# Patient Record
Sex: FEMALE | Race: WHITE | NOT HISPANIC OR LATINO
[De-identification: names, ages, dates, MRNs, and addresses within clinical notes are randomized per-mention and may not be internally consistent; named-entity substitution may affect disease eponyms.]

---

## 2017-03-17 PROBLEM — Z00.00 ENCOUNTER FOR PREVENTIVE HEALTH EXAMINATION: Status: ACTIVE | Noted: 2017-03-17

## 2017-04-05 ENCOUNTER — APPOINTMENT (OUTPATIENT)
Dept: UROLOGY | Facility: CLINIC | Age: 77
End: 2017-04-05

## 2017-04-05 ENCOUNTER — OUTPATIENT (OUTPATIENT)
Dept: OUTPATIENT SERVICES | Facility: HOSPITAL | Age: 77
LOS: 1 days | Discharge: HOME | End: 2017-04-05

## 2017-04-12 ENCOUNTER — APPOINTMENT (OUTPATIENT)
Dept: UROLOGY | Facility: CLINIC | Age: 77
End: 2017-04-12

## 2017-06-27 DIAGNOSIS — N20.0 CALCULUS OF KIDNEY: ICD-10-CM

## 2017-06-27 DIAGNOSIS — E78.00 PURE HYPERCHOLESTEROLEMIA, UNSPECIFIED: ICD-10-CM

## 2017-06-27 DIAGNOSIS — Z91.041 RADIOGRAPHIC DYE ALLERGY STATUS: ICD-10-CM

## 2017-06-27 DIAGNOSIS — I10 ESSENTIAL (PRIMARY) HYPERTENSION: ICD-10-CM

## 2017-07-05 ENCOUNTER — APPOINTMENT (OUTPATIENT)
Dept: UROLOGY | Facility: CLINIC | Age: 77
End: 2017-07-05

## 2017-07-05 VITALS
HEIGHT: 64 IN | SYSTOLIC BLOOD PRESSURE: 137 MMHG | BODY MASS INDEX: 25.95 KG/M2 | WEIGHT: 152 LBS | HEART RATE: 93 BPM | DIASTOLIC BLOOD PRESSURE: 76 MMHG

## 2017-07-05 DIAGNOSIS — R31.29 OTHER MICROSCOPIC HEMATURIA: ICD-10-CM

## 2017-07-05 DIAGNOSIS — Z86.39 PERSONAL HISTORY OF OTHER ENDOCRINE, NUTRITIONAL AND METABOLIC DISEASE: ICD-10-CM

## 2017-07-05 DIAGNOSIS — Z80.52 FAMILY HISTORY OF MALIGNANT NEOPLASM OF BLADDER: ICD-10-CM

## 2017-07-05 DIAGNOSIS — Z82.49 FAMILY HISTORY OF ISCHEMIC HEART DISEASE AND OTHER DISEASES OF THE CIRCULATORY SYSTEM: ICD-10-CM

## 2017-07-05 DIAGNOSIS — Z63.4 DISAPPEARANCE AND DEATH OF FAMILY MEMBER: ICD-10-CM

## 2017-07-05 DIAGNOSIS — Z87.442 PERSONAL HISTORY OF URINARY CALCULI: ICD-10-CM

## 2017-07-05 DIAGNOSIS — Z86.79 PERSONAL HISTORY OF OTHER DISEASES OF THE CIRCULATORY SYSTEM: ICD-10-CM

## 2017-07-05 LAB
BILIRUB UR QL STRIP: NORMAL
CLARITY UR: CLEAR
COLLECTION METHOD: NORMAL
GLUCOSE UR-MCNC: NORMAL
HCG UR QL: 2 EU/DL
HGB UR QL STRIP.AUTO: 10
KETONES UR-MCNC: NORMAL
LEUKOCYTE ESTERASE UR QL STRIP: 25
NITRITE UR QL STRIP: NORMAL
PH UR STRIP: 5
PROT UR STRIP-MCNC: 30
SP GR UR STRIP: 1.02

## 2017-07-05 SDOH — SOCIAL STABILITY - SOCIAL INSECURITY: DISSAPEARANCE AND DEATH OF FAMILY MEMBER: Z63.4

## 2017-08-25 ENCOUNTER — OUTPATIENT (OUTPATIENT)
Dept: OUTPATIENT SERVICES | Facility: HOSPITAL | Age: 77
LOS: 1 days | Discharge: HOME | End: 2017-08-25

## 2017-08-25 DIAGNOSIS — N85.00 ENDOMETRIAL HYPERPLASIA, UNSPECIFIED: ICD-10-CM

## 2017-08-25 DIAGNOSIS — Z01.818 ENCOUNTER FOR OTHER PREPROCEDURAL EXAMINATION: ICD-10-CM

## 2017-08-25 DIAGNOSIS — N95.0 POSTMENOPAUSAL BLEEDING: ICD-10-CM

## 2017-09-08 ENCOUNTER — OUTPATIENT (OUTPATIENT)
Dept: OUTPATIENT SERVICES | Facility: HOSPITAL | Age: 77
LOS: 1 days | Discharge: HOME | End: 2017-09-08

## 2017-09-13 DIAGNOSIS — C54.1 MALIGNANT NEOPLASM OF ENDOMETRIUM: ICD-10-CM

## 2017-09-13 DIAGNOSIS — E78.00 PURE HYPERCHOLESTEROLEMIA, UNSPECIFIED: ICD-10-CM

## 2017-09-13 DIAGNOSIS — Z91.041 RADIOGRAPHIC DYE ALLERGY STATUS: ICD-10-CM

## 2017-09-13 DIAGNOSIS — N95.0 POSTMENOPAUSAL BLEEDING: ICD-10-CM

## 2017-09-13 DIAGNOSIS — I10 ESSENTIAL (PRIMARY) HYPERTENSION: ICD-10-CM

## 2019-05-24 ENCOUNTER — APPOINTMENT (OUTPATIENT)
Dept: ORTHOPEDIC SURGERY | Facility: CLINIC | Age: 79
End: 2019-05-24
Payer: MEDICARE

## 2019-05-24 DIAGNOSIS — M25.551 PAIN IN RIGHT HIP: ICD-10-CM

## 2019-05-24 DIAGNOSIS — M25.552 PAIN IN LEFT HIP: ICD-10-CM

## 2019-05-24 DIAGNOSIS — M16.12 UNILATERAL PRIMARY OSTEOARTHRITIS, LEFT HIP: ICD-10-CM

## 2019-05-24 DIAGNOSIS — Z85.42 PERSONAL HISTORY OF MALIGNANT NEOPLASM OF OTHER PARTS OF UTERUS: ICD-10-CM

## 2019-05-24 DIAGNOSIS — Z87.39 PERSONAL HISTORY OF OTHER DISEASES OF THE MUSCULOSKELETAL SYSTEM AND CONNECTIVE TISSUE: ICD-10-CM

## 2019-05-24 PROCEDURE — 99203 OFFICE O/P NEW LOW 30 MIN: CPT

## 2019-05-24 PROCEDURE — 73522 X-RAY EXAM HIPS BI 3-4 VIEWS: CPT

## 2019-05-24 RX ORDER — ALENDRONATE SODIUM 35 MG/1
35 TABLET ORAL
Refills: 0 | Status: ACTIVE | COMMUNITY

## 2019-05-24 RX ORDER — LOVASTATIN 40 MG/1
40 TABLET ORAL
Refills: 0 | Status: ACTIVE | COMMUNITY

## 2019-05-24 RX ORDER — LOSARTAN POTASSIUM 50 MG/1
50 TABLET, FILM COATED ORAL
Refills: 0 | Status: ACTIVE | COMMUNITY

## 2019-05-24 RX ORDER — LOVASTATIN 40 MG/1
TABLET ORAL
Refills: 0 | Status: DISCONTINUED | COMMUNITY
End: 2019-05-24

## 2019-05-24 RX ORDER — VALSARTAN 160 MG
CAPSULE ORAL
Refills: 0 | Status: DISCONTINUED | COMMUNITY
End: 2019-05-24

## 2019-05-24 NOTE — HISTORY OF PRESENT ILLNESS
[Pain Location] : pain [de-identified] : 78 year old female with a significant past medical history of uterine cancer presents to the office today complaining of bilateral hip pain. She reports the left hip is more painful than the right. Pt reports noticing the pain about 1.5 years ago. She states the pain has gotten worse since she first noticed it. She reports increased pain with standing from a seated position and long periods of ambulation. She reports taking Extra strength Tylenol with only minimal relief. She is ambulating with a cane due to her pain. Pt reports some of her activities of daily living are compromised. Pt is looking to discuss both surgical and non surgical options in regard to pain relief for her bilateral hips. [Worsening] : worsening [7] : a maximum pain level of 7/10 [Walking] : walking [Standing] : standing [Daily] : ~He/She~ states the symptoms seem to be occuring daily

## 2019-05-24 NOTE — PHYSICAL EXAM
[de-identified] : General appearance: well nourished and hydrated, pleasant, alert and oriented x 3, cooperative.\par Cardiovascular: no apparent abnormalities, no lower leg edema, no varicosities, pedal pulses are palpable.\par Neurologic: sensation is normal, no muscle weakness in upper or lower extremities.\par Gait: nonantalgic.\par \par Left hip\par Inspection: No swelling or ecchymosis.\par Wounds: none.\par Palpation: non-tender.\par Stability: no instability.\par Strength: 3/5 all motor groups.\par ROM: REstricted and painful active and passive ROM. Pain with ROM\par Leg length: equal.\par \par Right hip\par Inspection: No swelling or ecchymosis.\par Wounds: none.\par Palpation: non-tender.\par Stability: no instability.\par Strength: 5/5 all motor groups.\par ROM: Full active and passive ROM. Mild pain with abduction. \par Leg length: equal.\par \par \par

## 2021-01-02 ENCOUNTER — TRANSCRIPTION ENCOUNTER (OUTPATIENT)
Age: 81
End: 2021-01-02

## 2021-05-18 ENCOUNTER — RESULT REVIEW (OUTPATIENT)
Age: 81
End: 2021-05-18

## 2021-05-18 ENCOUNTER — OUTPATIENT (OUTPATIENT)
Dept: OUTPATIENT SERVICES | Facility: HOSPITAL | Age: 81
LOS: 1 days | Discharge: HOME | End: 2021-05-18
Payer: MEDICARE

## 2021-05-18 VITALS
HEIGHT: 64 IN | RESPIRATION RATE: 20 BRPM | OXYGEN SATURATION: 100 % | WEIGHT: 164.02 LBS | DIASTOLIC BLOOD PRESSURE: 70 MMHG | TEMPERATURE: 97 F | HEART RATE: 63 BPM | SYSTOLIC BLOOD PRESSURE: 157 MMHG

## 2021-05-18 DIAGNOSIS — Z01.818 ENCOUNTER FOR OTHER PREPROCEDURAL EXAMINATION: ICD-10-CM

## 2021-05-18 DIAGNOSIS — M16.12 UNILATERAL PRIMARY OSTEOARTHRITIS, LEFT HIP: ICD-10-CM

## 2021-05-18 DIAGNOSIS — Z98.890 OTHER SPECIFIED POSTPROCEDURAL STATES: Chronic | ICD-10-CM

## 2021-05-18 LAB
A1C WITH ESTIMATED AVERAGE GLUCOSE RESULT: 5.5 % — SIGNIFICANT CHANGE UP (ref 4–5.6)
ALBUMIN SERPL ELPH-MCNC: 4.7 G/DL — SIGNIFICANT CHANGE UP (ref 3.5–5.2)
ALP SERPL-CCNC: 119 U/L — HIGH (ref 30–115)
ALT FLD-CCNC: 17 U/L — SIGNIFICANT CHANGE UP (ref 0–41)
ANION GAP SERPL CALC-SCNC: 13 MMOL/L — SIGNIFICANT CHANGE UP (ref 7–14)
APTT BLD: 30.9 SEC — SIGNIFICANT CHANGE UP (ref 27–39.2)
AST SERPL-CCNC: 20 U/L — SIGNIFICANT CHANGE UP (ref 0–41)
BASOPHILS # BLD AUTO: 0.02 K/UL — SIGNIFICANT CHANGE UP (ref 0–0.2)
BASOPHILS NFR BLD AUTO: 0.4 % — SIGNIFICANT CHANGE UP (ref 0–1)
BILIRUB SERPL-MCNC: 0.4 MG/DL — SIGNIFICANT CHANGE UP (ref 0.2–1.2)
BUN SERPL-MCNC: 17 MG/DL — SIGNIFICANT CHANGE UP (ref 10–20)
CALCIUM SERPL-MCNC: 9.8 MG/DL — SIGNIFICANT CHANGE UP (ref 8.5–10.1)
CHLORIDE SERPL-SCNC: 100 MMOL/L — SIGNIFICANT CHANGE UP (ref 98–110)
CO2 SERPL-SCNC: 26 MMOL/L — SIGNIFICANT CHANGE UP (ref 17–32)
CREAT SERPL-MCNC: 0.6 MG/DL — LOW (ref 0.7–1.5)
EOSINOPHIL # BLD AUTO: 0.07 K/UL — SIGNIFICANT CHANGE UP (ref 0–0.7)
EOSINOPHIL NFR BLD AUTO: 1.3 % — SIGNIFICANT CHANGE UP (ref 0–8)
ESTIMATED AVERAGE GLUCOSE: 111 MG/DL — SIGNIFICANT CHANGE UP (ref 68–114)
GLUCOSE SERPL-MCNC: 84 MG/DL — SIGNIFICANT CHANGE UP (ref 70–99)
HCT VFR BLD CALC: 37.2 % — SIGNIFICANT CHANGE UP (ref 37–47)
HGB BLD-MCNC: 11.3 G/DL — LOW (ref 12–16)
IMM GRANULOCYTES NFR BLD AUTO: 0.4 % — HIGH (ref 0.1–0.3)
INR BLD: 0.99 RATIO — SIGNIFICANT CHANGE UP (ref 0.65–1.3)
LYMPHOCYTES # BLD AUTO: 0.82 K/UL — LOW (ref 1.2–3.4)
LYMPHOCYTES # BLD AUTO: 15 % — LOW (ref 20.5–51.1)
MCHC RBC-ENTMCNC: 21.4 PG — LOW (ref 27–31)
MCHC RBC-ENTMCNC: 30.4 G/DL — LOW (ref 32–37)
MCV RBC AUTO: 70.6 FL — LOW (ref 81–99)
MONOCYTES # BLD AUTO: 0.43 K/UL — SIGNIFICANT CHANGE UP (ref 0.1–0.6)
MONOCYTES NFR BLD AUTO: 7.9 % — SIGNIFICANT CHANGE UP (ref 1.7–9.3)
MRSA PCR RESULT.: NEGATIVE — SIGNIFICANT CHANGE UP
NEUTROPHILS # BLD AUTO: 4.1 K/UL — SIGNIFICANT CHANGE UP (ref 1.4–6.5)
NEUTROPHILS NFR BLD AUTO: 75 % — SIGNIFICANT CHANGE UP (ref 42.2–75.2)
NRBC # BLD: 0 /100 WBCS — SIGNIFICANT CHANGE UP (ref 0–0)
PLATELET # BLD AUTO: 234 K/UL — SIGNIFICANT CHANGE UP (ref 130–400)
POTASSIUM SERPL-MCNC: 4.2 MMOL/L — SIGNIFICANT CHANGE UP (ref 3.5–5)
POTASSIUM SERPL-SCNC: 4.2 MMOL/L — SIGNIFICANT CHANGE UP (ref 3.5–5)
PROT SERPL-MCNC: 7 G/DL — SIGNIFICANT CHANGE UP (ref 6–8)
PROTHROM AB SERPL-ACNC: 11.4 SEC — SIGNIFICANT CHANGE UP (ref 9.95–12.87)
RBC # BLD: 5.27 M/UL — SIGNIFICANT CHANGE UP (ref 4.2–5.4)
RBC # FLD: 14.6 % — HIGH (ref 11.5–14.5)
SODIUM SERPL-SCNC: 139 MMOL/L — SIGNIFICANT CHANGE UP (ref 135–146)
WBC # BLD: 5.46 K/UL — SIGNIFICANT CHANGE UP (ref 4.8–10.8)
WBC # FLD AUTO: 5.46 K/UL — SIGNIFICANT CHANGE UP (ref 4.8–10.8)

## 2021-05-18 PROCEDURE — 71046 X-RAY EXAM CHEST 2 VIEWS: CPT | Mod: 26

## 2021-05-18 PROCEDURE — 93010 ELECTROCARDIOGRAM REPORT: CPT

## 2021-05-18 PROCEDURE — 73502 X-RAY EXAM HIP UNI 2-3 VIEWS: CPT | Mod: 26,LT

## 2021-05-18 RX ORDER — LOVASTATIN 20 MG
1 TABLET ORAL
Qty: 0 | Refills: 0 | DISCHARGE

## 2021-05-18 RX ORDER — AMLODIPINE BESYLATE 2.5 MG/1
1 TABLET ORAL
Qty: 0 | Refills: 0 | DISCHARGE

## 2021-05-18 NOTE — H&P PST ADULT - NSICDXPASTMEDICALHX_GEN_ALL_CORE_FT
PAST MEDICAL HISTORY:  Arthritis     HTN (hypertension)     Hypercholesteremia     Renal calculi     Uterine cancer

## 2021-05-18 NOTE — H&P PST ADULT - HISTORY OF PRESENT ILLNESS
Patient is a 80 year old female presenting to [facility] in preparation for  left hip replacement on  6/2 under regional anesthesia by Dr. Redd  reports no c/o cp,sob,palpitations,cough or dysuris  1-2 fos without sob    Patient denies any signs or symptoms of COVID 19 and denies contact with known positive individuals.  They have an appointment for repeat COVID testing pre-procedure and acknowledge its time and place.  They were instructed to quarantine pre-procedure, practice exposure control measures, continue to self-monitor and report any concerns to their proceduralist.    Anesthesia Alert  NO--Difficult Airway  NO--History of neck surgery or radiation  NO--Limited ROM of neck  NO--History of Malignant hyperthermia  NO--Personal or family history of Pseudocholinesterase deficiency  NO--Prior Anesthesia Complication  NO--Latex Allergy  YES--Loose teeth/  LOWER DENTURES  NO--History of Rheumatoid Arthritis  NO--HALIMA  NO-- BLEEDING RISK  NO--Other_____    As per patient, this is their complete medical and surgical history, including medications both prescribed or over the counter.  Patient verbalized understanding of instructions and was given the opportunity to ask questions and have them answered.

## 2021-05-18 NOTE — H&P PST ADULT - ENMT
Pending Prescriptions:                       Disp   Refills    oxyCODONE-acetaminophen (PERCOCET) 5-325 M*24 tab*0        Sig: Take 1 tablet by mouth every 6 hours as needed for           pain    Routing refill request to provider for review/approval because:  Drug not on the [facility] refill protocol          No oral lesions; no gross abnormalities

## 2021-05-18 NOTE — H&P PST ADULT - NSICDXFAMILYHX_GEN_ALL_CORE_FT
FAMILY HISTORY:  Family history of CHF (congestive heart failure)    Mother  Still living? No  FH: cancer, Age at diagnosis: Age Unknown

## 2021-05-30 ENCOUNTER — OUTPATIENT (OUTPATIENT)
Dept: OUTPATIENT SERVICES | Facility: HOSPITAL | Age: 81
LOS: 1 days | Discharge: HOME | End: 2021-05-30

## 2021-05-30 DIAGNOSIS — Z11.59 ENCOUNTER FOR SCREENING FOR OTHER VIRAL DISEASES: ICD-10-CM

## 2021-05-30 DIAGNOSIS — Z98.890 OTHER SPECIFIED POSTPROCEDURAL STATES: Chronic | ICD-10-CM

## 2021-05-30 PROBLEM — I10 ESSENTIAL (PRIMARY) HYPERTENSION: Chronic | Status: ACTIVE | Noted: 2021-05-18

## 2021-05-30 PROBLEM — E78.00 PURE HYPERCHOLESTEROLEMIA, UNSPECIFIED: Chronic | Status: ACTIVE | Noted: 2021-05-18

## 2021-05-30 PROBLEM — C55 MALIGNANT NEOPLASM OF UTERUS, PART UNSPECIFIED: Chronic | Status: ACTIVE | Noted: 2021-05-18

## 2021-05-30 PROBLEM — M19.90 UNSPECIFIED OSTEOARTHRITIS, UNSPECIFIED SITE: Chronic | Status: ACTIVE | Noted: 2021-05-18

## 2021-05-30 PROBLEM — N20.0 CALCULUS OF KIDNEY: Chronic | Status: ACTIVE | Noted: 2021-05-18

## 2021-06-01 ENCOUNTER — FORM ENCOUNTER (OUTPATIENT)
Age: 81
End: 2021-06-01

## 2021-06-02 ENCOUNTER — INPATIENT (INPATIENT)
Facility: HOSPITAL | Age: 81
LOS: 0 days | Discharge: HOME HEALTH | End: 2021-06-03
Attending: ORTHOPAEDIC SURGERY | Admitting: ORTHOPAEDIC SURGERY
Payer: MEDICARE

## 2021-06-02 ENCOUNTER — RESULT REVIEW (OUTPATIENT)
Age: 81
End: 2021-06-02

## 2021-06-02 VITALS
HEIGHT: 64 IN | WEIGHT: 158.07 LBS | RESPIRATION RATE: 17 BRPM | HEART RATE: 72 BPM | DIASTOLIC BLOOD PRESSURE: 75 MMHG | OXYGEN SATURATION: 98 % | SYSTOLIC BLOOD PRESSURE: 174 MMHG | TEMPERATURE: 98 F

## 2021-06-02 DIAGNOSIS — Z98.890 OTHER SPECIFIED POSTPROCEDURAL STATES: Chronic | ICD-10-CM

## 2021-06-02 DIAGNOSIS — Z90.710 ACQUIRED ABSENCE OF BOTH CERVIX AND UTERUS: Chronic | ICD-10-CM

## 2021-06-02 PROCEDURE — 88311 DECALCIFY TISSUE: CPT | Mod: 26

## 2021-06-02 PROCEDURE — 99222 1ST HOSP IP/OBS MODERATE 55: CPT

## 2021-06-02 PROCEDURE — 88304 TISSUE EXAM BY PATHOLOGIST: CPT | Mod: 26

## 2021-06-02 RX ORDER — ONDANSETRON 8 MG/1
4 TABLET, FILM COATED ORAL EVERY 6 HOURS
Refills: 0 | Status: DISCONTINUED | OUTPATIENT
Start: 2021-06-02 | End: 2021-06-03

## 2021-06-02 RX ORDER — TRAMADOL HYDROCHLORIDE 50 MG/1
50 TABLET ORAL EVERY 4 HOURS
Refills: 0 | Status: DISCONTINUED | OUTPATIENT
Start: 2021-06-02 | End: 2021-06-03

## 2021-06-02 RX ORDER — POLYETHYLENE GLYCOL 3350 17 G/17G
17 POWDER, FOR SOLUTION ORAL AT BEDTIME
Refills: 0 | Status: DISCONTINUED | OUTPATIENT
Start: 2021-06-02 | End: 2021-06-03

## 2021-06-02 RX ORDER — SODIUM CHLORIDE 9 MG/ML
1000 INJECTION INTRAMUSCULAR; INTRAVENOUS; SUBCUTANEOUS
Refills: 0 | Status: DISCONTINUED | OUTPATIENT
Start: 2021-06-02 | End: 2021-06-03

## 2021-06-02 RX ORDER — CEFAZOLIN SODIUM 1 G
2000 VIAL (EA) INJECTION EVERY 8 HOURS
Refills: 0 | Status: COMPLETED | OUTPATIENT
Start: 2021-06-02 | End: 2021-06-02

## 2021-06-02 RX ORDER — HYDROMORPHONE HYDROCHLORIDE 2 MG/ML
0.5 INJECTION INTRAMUSCULAR; INTRAVENOUS; SUBCUTANEOUS
Refills: 0 | Status: DISCONTINUED | OUTPATIENT
Start: 2021-06-02 | End: 2021-06-02

## 2021-06-02 RX ORDER — MAGNESIUM HYDROXIDE 400 MG/1
30 TABLET, CHEWABLE ORAL DAILY
Refills: 0 | Status: DISCONTINUED | OUTPATIENT
Start: 2021-06-02 | End: 2021-06-03

## 2021-06-02 RX ORDER — ACETAMINOPHEN 500 MG
1000 TABLET ORAL ONCE
Refills: 0 | Status: COMPLETED | OUTPATIENT
Start: 2021-06-02 | End: 2021-06-02

## 2021-06-02 RX ORDER — ONDANSETRON 8 MG/1
4 TABLET, FILM COATED ORAL ONCE
Refills: 0 | Status: DISCONTINUED | OUTPATIENT
Start: 2021-06-02 | End: 2021-06-02

## 2021-06-02 RX ORDER — AMLODIPINE BESYLATE 2.5 MG/1
2.5 TABLET ORAL AT BEDTIME
Refills: 0 | Status: DISCONTINUED | OUTPATIENT
Start: 2021-06-02 | End: 2021-06-03

## 2021-06-02 RX ORDER — CELECOXIB 200 MG/1
200 CAPSULE ORAL ONCE
Refills: 0 | Status: COMPLETED | OUTPATIENT
Start: 2021-06-02 | End: 2021-06-02

## 2021-06-02 RX ORDER — LOVASTATIN 20 MG
1 TABLET ORAL
Qty: 0 | Refills: 0 | DISCHARGE

## 2021-06-02 RX ORDER — HYDROMORPHONE HYDROCHLORIDE 2 MG/ML
1 INJECTION INTRAMUSCULAR; INTRAVENOUS; SUBCUTANEOUS
Refills: 0 | Status: DISCONTINUED | OUTPATIENT
Start: 2021-06-02 | End: 2021-06-02

## 2021-06-02 RX ORDER — SENNA PLUS 8.6 MG/1
2 TABLET ORAL AT BEDTIME
Refills: 0 | Status: DISCONTINUED | OUTPATIENT
Start: 2021-06-02 | End: 2021-06-03

## 2021-06-02 RX ORDER — KETOROLAC TROMETHAMINE 30 MG/ML
15 SYRINGE (ML) INJECTION EVERY 6 HOURS
Refills: 0 | Status: DISCONTINUED | OUTPATIENT
Start: 2021-06-02 | End: 2021-06-03

## 2021-06-02 RX ORDER — PANTOPRAZOLE SODIUM 20 MG/1
40 TABLET, DELAYED RELEASE ORAL
Refills: 0 | Status: DISCONTINUED | OUTPATIENT
Start: 2021-06-02 | End: 2021-06-03

## 2021-06-02 RX ORDER — ACETAMINOPHEN 500 MG
650 TABLET ORAL EVERY 6 HOURS
Refills: 0 | Status: DISCONTINUED | OUTPATIENT
Start: 2021-06-02 | End: 2021-06-03

## 2021-06-02 RX ORDER — SODIUM CHLORIDE 9 MG/ML
1000 INJECTION, SOLUTION INTRAVENOUS
Refills: 0 | Status: DISCONTINUED | OUTPATIENT
Start: 2021-06-02 | End: 2021-06-02

## 2021-06-02 RX ORDER — AMLODIPINE BESYLATE 2.5 MG/1
1 TABLET ORAL
Qty: 0 | Refills: 0 | DISCHARGE

## 2021-06-02 RX ORDER — ASPIRIN/CALCIUM CARB/MAGNESIUM 324 MG
81 TABLET ORAL
Refills: 0 | Status: DISCONTINUED | OUTPATIENT
Start: 2021-06-02 | End: 2021-06-03

## 2021-06-02 RX ORDER — CELECOXIB 200 MG/1
200 CAPSULE ORAL EVERY 12 HOURS
Refills: 0 | Status: DISCONTINUED | OUTPATIENT
Start: 2021-06-03 | End: 2021-06-03

## 2021-06-02 RX ADMIN — SODIUM CHLORIDE 100 MILLILITER(S): 9 INJECTION, SOLUTION INTRAVENOUS at 10:24

## 2021-06-02 RX ADMIN — Medication 650 MILLIGRAM(S): at 11:38

## 2021-06-02 RX ADMIN — Medication 650 MILLIGRAM(S): at 19:18

## 2021-06-02 RX ADMIN — POLYETHYLENE GLYCOL 3350 17 GRAM(S): 17 POWDER, FOR SOLUTION ORAL at 21:43

## 2021-06-02 RX ADMIN — SENNA PLUS 2 TABLET(S): 8.6 TABLET ORAL at 21:43

## 2021-06-02 RX ADMIN — Medication 15 MILLIGRAM(S): at 11:38

## 2021-06-02 RX ADMIN — Medication 15 MILLIGRAM(S): at 19:19

## 2021-06-02 RX ADMIN — SODIUM CHLORIDE 100 MILLILITER(S): 9 INJECTION INTRAMUSCULAR; INTRAVENOUS; SUBCUTANEOUS at 12:48

## 2021-06-02 RX ADMIN — Medication 650 MILLIGRAM(S): at 18:00

## 2021-06-02 RX ADMIN — Medication 15 MILLIGRAM(S): at 12:30

## 2021-06-02 RX ADMIN — CELECOXIB 200 MILLIGRAM(S): 200 CAPSULE ORAL at 06:34

## 2021-06-02 RX ADMIN — Medication 81 MILLIGRAM(S): at 18:01

## 2021-06-02 RX ADMIN — Medication 650 MILLIGRAM(S): at 12:30

## 2021-06-02 RX ADMIN — Medication 100 MILLIGRAM(S): at 16:47

## 2021-06-02 RX ADMIN — HYDROMORPHONE HYDROCHLORIDE 0.5 MILLIGRAM(S): 2 INJECTION INTRAMUSCULAR; INTRAVENOUS; SUBCUTANEOUS at 10:44

## 2021-06-02 RX ADMIN — Medication 15 MILLIGRAM(S): at 18:01

## 2021-06-02 RX ADMIN — AMLODIPINE BESYLATE 2.5 MILLIGRAM(S): 2.5 TABLET ORAL at 21:42

## 2021-06-02 RX ADMIN — Medication 100 MILLIGRAM(S): at 22:28

## 2021-06-02 RX ADMIN — HYDROMORPHONE HYDROCHLORIDE 0.5 MILLIGRAM(S): 2 INJECTION INTRAMUSCULAR; INTRAVENOUS; SUBCUTANEOUS at 11:00

## 2021-06-02 RX ADMIN — Medication 1000 MILLIGRAM(S): at 06:35

## 2021-06-02 NOTE — PHYSICAL THERAPY INITIAL EVALUATION ADULT - IMPAIRMENTS CONTRIBUTING TO GAIT DEVIATIONS, PT EVAL
decreased endurance/impaired balance/narrow base of support/impaired postural control/decreased ROM/decreased strength

## 2021-06-02 NOTE — PHYSICAL THERAPY INITIAL EVALUATION ADULT - TRANSFER SAFETY CONCERNS NOTED: SIT/STAND, REHAB EVAL
(L) knee tends to buckle/losing balance/decreased sequencing ability/decreased weight-shifting ability

## 2021-06-02 NOTE — PHYSICAL THERAPY INITIAL EVALUATION ADULT - ACTIVE RANGE OF MOTION EXAMINATION, REHAB EVAL
LLE joints required AAROM/AROM with (L) hip flexion 0-75 degrees and (L) hip abduction 0-35 degrees in supine; Please refer to OT eval for BUE/Right LE Active ROM was WFL (within functional limits)

## 2021-06-02 NOTE — OCCUPATIONAL THERAPY INITIAL EVALUATION ADULT - GENERAL OBSERVATIONS, REHAB EVAL
14:00-14:25 chart reviewed, ok to treat by Occupational Therapist as confirmed by RN, Pt received semi-eng's  with brother and son present + IV (disconnected by ROSENDA Sanford) + aquacel dressing on left hip no drainage in NAD. Patient in agreement with OT IE.

## 2021-06-02 NOTE — PROGRESS NOTE ADULT - SUBJECTIVE AND OBJECTIVE BOX
ORTHO PROGRESS NOTE       80y Female POD #0      S/P  left Total Hip Arthroplasty     Patient seen and examined at bedside . The patient is awake and alert in NAD. No complaints of chest pain, SOB, N/V.    PAST MEDICAL & SURGICAL HISTORY:  HTN (hypertension)    Arthritis    Hypercholesteremia    Uterine cancer    Renal calculi    History of surgery  knee surg  randee  lithotripsy    History of lithotripsy  [facility]    S/P RANDEE-BSO    History of arthroscopy of both knees    History of tonsillectomy and adenoidectomy  [facility]    H/O colonoscopy  2021          MEDICATIONS  (STANDING):  acetaminophen   Tablet .. 650 milliGRAM(s) Oral every 6 hours  amLODIPine   Tablet 2.5 milliGRAM(s) Oral at bedtime  aspirin enteric coated 81 milliGRAM(s) Oral two times a day  ceFAZolin   IVPB 2000 milliGRAM(s) IV Intermittent every 8 hours  ketorolac   Injectable 15 milliGRAM(s) IV Push every 6 hours  pantoprazole    Tablet 40 milliGRAM(s) Oral before breakfast  polyethylene glycol 3350 17 Gram(s) Oral at bedtime  senna 2 Tablet(s) Oral at bedtime  sodium chloride 0.9%. 1000 milliLiter(s) (100 mL/Hr) IV Continuous <Continuous>    MEDICATIONS  (PRN):  aluminum hydroxide/magnesium hydroxide/simethicone Suspension 30 milliLiter(s) Oral four times a day PRN Indigestion  magnesium hydroxide Suspension 30 milliLiter(s) Oral daily PRN Constipation  ondansetron Injectable 4 milliGRAM(s) IV Push every 6 hours PRN Nausea and/or Vomiting  traMADol 50 milliGRAM(s) Oral every 4 hours PRN Mild Pain (1 - 3)        Vital Signs Last 24 Hrs  T(C): 36.6 (02 Jun 2021 13:05), Max: 36.7 (02 Jun 2021 09:56)  T(F): 97.8 (02 Jun 2021 13:05), Max: 98.1 (02 Jun 2021 09:56)  HR: 82 (02 Jun 2021 13:05) (70 - 88)  BP: 114/62 (02 Jun 2021 13:05) (108/52 - 174/75)  BP(mean): --  RR: 18 (02 Jun 2021 13:05) (14 - 20)  SpO2: 97% (02 Jun 2021 11:30) (94% - 98%)                      PE:  The patient was seen and examined at bedside          A&OX3, NAD          Aquacel  dressing C/D/I          Compartments soft         NVI, SILT           A/P:              POD # 0      s/p left Total Hip Arthroplasty                        OOB to Chair            Physical Therapy- wbat           Pain control - per pain protocol            Incentive Spirometry            DVT Prophylaxis - aspirin             f/u am labs                     GI ppx- continue Protonix                   Dispo: planning for home with home care

## 2021-06-02 NOTE — CONSULT NOTE ADULT - SUBJECTIVE AND OBJECTIVE BOX
CC.  S/P Left Total hip replacement   HPI.  Patient reports left hip pain is controlled.  Offers no other complaints    Constitutional: No fever, fatigue or weight loss.  Skin: No rash.  Eyes: No recent vision problems or eye pain.  ENT: No congestion, ear pain, or sore throat.  Endocrine: No thyroid problems.  Cardiovascular: No chest pain or palpation.  Respiratory: No cough, shortness of breath, congestion, or wheezing.  Gastrointestinal: No abdominal pain, nausea, vomiting, or diarrhea.  Genitourinary: No dysuria.  Musculoskeletal: No joint swelling.  Neurologic: No headache.          Allergies/Medications:   Allergies:        Allergies:  	tetracycline: Drug, Anaphylaxis  	contrast dye: Miscellaneous, Hives, Rash, contrast dye    Home Medications:   * Patient Currently Takes Medications as of 18-May-2021 07:45 documented in Structured Notes  · 	amLODIPine 2.5 mg oral tablet: Last Dose Taken:  , 1 tab(s) orally once a day (at bedtime)  · 	lovastatin 40 mg oral tablet: Last Dose Taken:  , 1 tab(s) orally once a day (at bedtime)    PMH/PSH/FH/SH:    Past Medical, Past Surgical, and Family History:  PAST MEDICAL HISTORY:  Arthritis     HTN (hypertension)     Hypercholesteremia     Renal calculi     Uterine cancer.     PAST SURGICAL HISTORY:  History of surgery knee surg  luna  lithotripsy.     FAMILY HISTORY:  Family history of CHF (congestive heart failure)    Mother  Still living? No  FH: cancer, Age at diagnosis: Age Unknown.     Social History:  · Marital Status	  · Occupation	retired  · Lives With	alone     Substance Use History:  · Substance Use	never used     Alcohol Use History:  · Have you ever consumed alcohol	never     Tobacco Usage:  · Tobacco Usage: Never smoker    Vital Signs Last 24 Hrs  T(C): 36.6 (02 Jun 2021 13:05), Max: 36.7 (02 Jun 2021 09:56)  T(F): 97.8 (02 Jun 2021 13:05), Max: 98.1 (02 Jun 2021 09:56)  HR: 82 (02 Jun 2021 13:05) (70 - 88)  BP: 114/62 (02 Jun 2021 13:05) (108/52 - 174/75)  BP(mean): --  RR: 18 (02 Jun 2021 13:05) (14 - 20)  SpO2: 97% (02 Jun 2021 11:30) (94% - 98%)          PHYSICAL EXAM-  GENERAL: NAD, well-groomed, well-developed  HEAD:  Atraumatic, Normocephalic  EYES: EOMI, PERRLA, conjunctiva and sclera clear  NECK: Supple, No JVD, Normal thyroid  NERVOUS SYSTEM:  Alert & Oriented X3, Motor Strength 5/5 B/L upper and lower extremities; DTRs 2+ intact and symmetric  CHEST/LUNG: Clear to percussion bilaterally; No rales, rhonchi, wheezing, or rubs  HEART: Regular rate and rhythm; No murmurs, rubs, or gallops  ABDOMEN: Soft, Nontender, Nondistended; Bowel sounds present  EXTREMITIES:  2+ Peripheral Pulses, No clubbing, cyanosis, or edema  SKIN: No rashes or lesions          Imaging Personally Reviewed:     [x ] YES  [ ] NO    Consultant(s) Notes Reviewed:  [x ] YES  [ ] NO    Care Discussed with Consultants/Other Providers [x ] YES  [ ] No medical contraindication for discharge

## 2021-06-02 NOTE — CONSULT NOTE ADULT - ASSESSMENT
Patient is 81 yo female with hx of Arthritis, HTN (hypertension), Hypercholesteremia, Renal calculi, and Uterine cancer presenting with     1.  S/P left Total hip replacement   Continue with pain management, DVT proph, and wound care as per Ortho.  PT/OT    2. HTN/HLD  -Continue with home medications  -Monitor BP      #Progress Note Handoff  Pending (specify):  as above   Family discussion:  plan of care was discussed with patient  in details.  all questions were answered.  seems to understand, and in agreement  Disposition:  unknown

## 2021-06-02 NOTE — CHART NOTE - NSCHARTNOTEFT_GEN_A_CORE
PACU ANESTHESIA ADMISSION NOTE      Procedure:   Post op diagnosis:      ____  Intubated  TV:______       Rate: ______      FiO2: ______    __x__  Patent Airway    _x___  Full return of protective reflexes    ___x_  Full recovery from anesthesia / back to baseline     Vitals:   T:      98.1     R:      18            BP:   132/60               Sat:    96               P: 91      Mental Status:  __x__ Awake   ___x__ Alert   _____ Drowsy   _____ Sedated    Nausea/Vomiting:  _x___ NO  ______Yes,   See Post - Op Orders          Pain Scale (0-10):  ___0__    Treatment: ____ None    ____ See Post - Op/PCA Orders    Post - Operative Fluids:   ____ Oral   __x__ See Post - Op Orders    Plan: Discharge:   ____Home       __x___Floor     _____Critical Care    _____  Other:_________________    Comments:  d/c to floor when meets criteria

## 2021-06-02 NOTE — ASU PATIENT PROFILE, ADULT - PSH
H/O colonoscopy  2021  History of arthroscopy of both knees    History of lithotripsy  [facility]  History of surgery  knee surg  randee  lithotripsy  History of tonsillectomy and adenoidectomy  [facility]  S/P RANDEE-BSO

## 2021-06-02 NOTE — OCCUPATIONAL THERAPY INITIAL EVALUATION ADULT - LIVES WITH, PROFILE
In an apartment on the 4th floor elevator accessible [facility]. Positive tub 2 grab bars. Positive grab bar at toilet./alone

## 2021-06-02 NOTE — PHYSICAL THERAPY INITIAL EVALUATION ADULT - MANUAL MUSCLE TESTING RESULTS, REHAB EVAL
RLE ms strength grossly graded 3 to 3+/5; (L) hip 3-/5; (L) knee/ankle 3- to 3/5; Please refer to STARR lala for BUE

## 2021-06-02 NOTE — PHYSICAL THERAPY INITIAL EVALUATION ADULT - ADDITIONAL COMMENTS
Per patient, she has own apartment in a Senior Living facility where there are no steps to enter or inside apartment

## 2021-06-02 NOTE — PHYSICAL THERAPY INITIAL EVALUATION ADULT - GAIT DEVIATIONS NOTED, PT EVAL
stooped posture, dec heel strike/pushoff /stance on LLE, (L) knee easily katherin on stance, dec (L) hip/knee flexion on swing/decreased aj/decreased step length/decreased weight-shifting ability

## 2021-06-02 NOTE — PHYSICAL THERAPY INITIAL EVALUATION ADULT - PERTINENT HX OF CURRENT PROBLEM, REHAB EVAL
81 y/o female admitted for (L) JERMAINE , anterior approach, under regional anesthesia for Arthritis (L) hip; pod#0

## 2021-06-02 NOTE — PHYSICAL THERAPY INITIAL EVALUATION ADULT - GENERAL OBSERVATIONS, REHAB EVAL
14:25-14:55 Chart reviewed. Pt encountered sitting at edge of bed with OT, may be seen by Physical Therapist as confirmed with Nurse. Patient denied pain at rest and would like to walk to bathroom but "(L) knee gives out"; +Aquacel (L) hiip; compression socks; heplock

## 2021-06-02 NOTE — ASU PREOP CHECKLIST -  HIBICLENS SHOWER 2 DATE
Patient is a 89y old  Female who presents with a chief complaint of chest pressure, dizziness, near syncope (22 Jan 2019 15:21)      Patient seen and examined at bedside.  Patient denies any chest pain or shortness of breath.    ALLERGIES:  No Known Allergies    MEDICATIONS  (STANDING):  apixaban 2.5 milliGRAM(s) Oral every 12 hours  pantoprazole    Tablet 40 milliGRAM(s) Oral before breakfast    MEDICATIONS  (PRN):    Vital Signs Last 24 Hrs  T(F): 97.9 (22 Jan 2019 13:14), Max: 97.9 (22 Jan 2019 13:14)  HR: 60 (22 Jan 2019 13:14) (54 - 61)  BP: 150/70 (22 Jan 2019 13:14) (147/55 - 156/63)  RR: 16 (22 Jan 2019 13:14) (15 - 16)  SpO2: 95% (22 Jan 2019 13:14) (95% - 97%)  I&O's Summary    21 Jan 2019 07:01  -  22 Jan 2019 07:00  --------------------------------------------------------  IN: 400 mL / OUT: 0 mL / NET: 400 mL        PHYSICAL EXAM:  General: NAD, A/O x 3  ENT: MMM  Neck: Supple, No JVD  Lungs: Clear to auscultation bilaterally  Cardio: RRR, S1/S2, No murmurs  Abdomen: Soft, Nontender, Nondistended; Bowel sounds present  Extremities: No cyanosis, No edema    LABS:                        12.6   13.0  )-----------( 268      ( 21 Jan 2019 02:20 )             38.8     01-22    142  |  106  |  48  ----------------------------<  86  4.3   |  25  |  1.63    Ca    9.1      22 Jan 2019 06:25  Mg     2.0     01-21    TPro  6.1  /  Alb  2.8  /  TBili  0.6  /  DBili  0.2  /  AST  271  /  ALT  322  /  AlkPhos  154  01-22    eGFR if Non African American: 26 mL/min/1.73M2 (01-22-19 @ 06:25)  eGFR if African American: 30 mL/min/1.73M2 (01-22-19 @ 06:25)    PT/INR - ( 21 Jan 2019 02:20 )   PT: 19.5 sec;   INR: 1.71 ratio         PTT - ( 21 Jan 2019 02:20 )  PTT:29.0 sec    CARDIAC MARKERS ( 21 Jan 2019 02:20 )  .056 ng/mL / x     / x     / x     / x            TSH 4.86   TSH with FT4 reflex --  Total T3 --        CAPILLARY BLOOD GLUCOSE                RADIOLOGY & ADDITIONAL TESTS:    Care Discussed with Consultants/Other Providers: 01-Jun-2021 20:00

## 2021-06-02 NOTE — ASU PREOP CHECKLIST - WEIGHT IN LBS
SOCIAL WORK PROGRESS NOTE  Monthly Check-in      DATA:     This writer checked in with pt and parent (Lasha) during HD session. Ka reports that things are going well with dialysis and at home. Lasha reports that Martha's work has been more flexible and understanding after this writer and other providers wrote letter's on his behalf and provided medical documentation related to Vicente's diagnosis and care needs. Lasha stated no current social work needs and thanked this writer for their previous assistance for her 's work.     INTERVENTION:      1. Provided ongoing assessment of patient and family's level of coping.   2. Provided psychosocial supportive counseling and crisis intervention as needed.     ASSESSMENT:     Pt appears to be coping well while on HD. Family is coping outside of hospital better after assistance from this writer and medical team.     PLAN:     Social work will continue to assess needs and provide ongoing psychosocial support and access to resources. Patient care information is discussed and reviewed, each Friday, during weekly Interdisciplinary Pediatric Nephrology Rounds.      YESI Batista, [facility]  Pediatric Nephrology Social Worker  Phone: [phone]  Pager: [phone]     *No Letter              158

## 2021-06-03 ENCOUNTER — TRANSCRIPTION ENCOUNTER (OUTPATIENT)
Age: 81
End: 2021-06-03

## 2021-06-03 VITALS
RESPIRATION RATE: 16 BRPM | TEMPERATURE: 97 F | DIASTOLIC BLOOD PRESSURE: 58 MMHG | SYSTOLIC BLOOD PRESSURE: 115 MMHG | HEART RATE: 86 BPM

## 2021-06-03 LAB
ANION GAP SERPL CALC-SCNC: 8 MMOL/L — SIGNIFICANT CHANGE UP (ref 7–14)
BUN SERPL-MCNC: 24 MG/DL — HIGH (ref 10–20)
CALCIUM SERPL-MCNC: 9.1 MG/DL — SIGNIFICANT CHANGE UP (ref 8.5–10.1)
CHLORIDE SERPL-SCNC: 106 MMOL/L — SIGNIFICANT CHANGE UP (ref 98–110)
CO2 SERPL-SCNC: 25 MMOL/L — SIGNIFICANT CHANGE UP (ref 17–32)
CREAT SERPL-MCNC: 0.7 MG/DL — SIGNIFICANT CHANGE UP (ref 0.7–1.5)
GLUCOSE SERPL-MCNC: 109 MG/DL — HIGH (ref 70–99)
HCT VFR BLD CALC: 26.7 % — LOW (ref 37–47)
HGB BLD-MCNC: 8.2 G/DL — LOW (ref 12–16)
MCHC RBC-ENTMCNC: 22.2 PG — LOW (ref 27–31)
MCHC RBC-ENTMCNC: 30.7 G/DL — LOW (ref 32–37)
MCV RBC AUTO: 72.2 FL — LOW (ref 81–99)
NRBC # BLD: 0 /100 WBCS — SIGNIFICANT CHANGE UP (ref 0–0)
PLATELET # BLD AUTO: 190 K/UL — SIGNIFICANT CHANGE UP (ref 130–400)
POTASSIUM SERPL-MCNC: 4.7 MMOL/L — SIGNIFICANT CHANGE UP (ref 3.5–5)
POTASSIUM SERPL-SCNC: 4.7 MMOL/L — SIGNIFICANT CHANGE UP (ref 3.5–5)
RBC # BLD: 3.7 M/UL — LOW (ref 4.2–5.4)
RBC # FLD: 14.6 % — HIGH (ref 11.5–14.5)
SODIUM SERPL-SCNC: 139 MMOL/L — SIGNIFICANT CHANGE UP (ref 135–146)
WBC # BLD: 13.6 K/UL — HIGH (ref 4.8–10.8)
WBC # FLD AUTO: 13.6 K/UL — HIGH (ref 4.8–10.8)

## 2021-06-03 RX ORDER — PANTOPRAZOLE SODIUM 20 MG/1
1 TABLET, DELAYED RELEASE ORAL
Qty: 30 | Refills: 0
Start: 2021-06-03 | End: 2021-07-02

## 2021-06-03 RX ORDER — SENNA PLUS 8.6 MG/1
2 TABLET ORAL
Qty: 0 | Refills: 0 | DISCHARGE
Start: 2021-06-03

## 2021-06-03 RX ORDER — ACETAMINOPHEN 500 MG
2 TABLET ORAL
Qty: 0 | Refills: 0 | DISCHARGE
Start: 2021-06-03

## 2021-06-03 RX ORDER — ACETAMINOPHEN 500 MG
2 TABLET ORAL
Qty: 0 | Refills: 0 | DISCHARGE

## 2021-06-03 RX ORDER — FERROUS SULFATE 325(65) MG
325 TABLET ORAL DAILY
Refills: 0 | Status: DISCONTINUED | OUTPATIENT
Start: 2021-06-03 | End: 2021-06-03

## 2021-06-03 RX ORDER — ASPIRIN/CALCIUM CARB/MAGNESIUM 324 MG
1 TABLET ORAL
Qty: 70 | Refills: 0
Start: 2021-06-03 | End: 2021-07-07

## 2021-06-03 RX ORDER — TRAMADOL HYDROCHLORIDE 50 MG/1
1 TABLET ORAL
Qty: 42 | Refills: 0
Start: 2021-06-03 | End: 2021-06-09

## 2021-06-03 RX ORDER — CELECOXIB 200 MG/1
1 CAPSULE ORAL
Qty: 28 | Refills: 0
Start: 2021-06-03 | End: 2021-06-16

## 2021-06-03 RX ADMIN — Medication 650 MILLIGRAM(S): at 01:33

## 2021-06-03 RX ADMIN — Medication 15 MILLIGRAM(S): at 05:58

## 2021-06-03 RX ADMIN — Medication 325 MILLIGRAM(S): at 11:01

## 2021-06-03 RX ADMIN — Medication 15 MILLIGRAM(S): at 00:16

## 2021-06-03 RX ADMIN — Medication 650 MILLIGRAM(S): at 00:16

## 2021-06-03 RX ADMIN — Medication 15 MILLIGRAM(S): at 06:50

## 2021-06-03 RX ADMIN — Medication 650 MILLIGRAM(S): at 11:01

## 2021-06-03 RX ADMIN — CELECOXIB 200 MILLIGRAM(S): 200 CAPSULE ORAL at 05:59

## 2021-06-03 RX ADMIN — CELECOXIB 200 MILLIGRAM(S): 200 CAPSULE ORAL at 06:50

## 2021-06-03 RX ADMIN — Medication 81 MILLIGRAM(S): at 06:02

## 2021-06-03 RX ADMIN — SODIUM CHLORIDE 100 MILLILITER(S): 9 INJECTION INTRAMUSCULAR; INTRAVENOUS; SUBCUTANEOUS at 00:16

## 2021-06-03 RX ADMIN — Medication 15 MILLIGRAM(S): at 01:33

## 2021-06-03 RX ADMIN — Medication 650 MILLIGRAM(S): at 06:50

## 2021-06-03 RX ADMIN — Medication 650 MILLIGRAM(S): at 05:58

## 2021-06-03 RX ADMIN — Medication 650 MILLIGRAM(S): at 11:08

## 2021-06-03 RX ADMIN — PANTOPRAZOLE SODIUM 40 MILLIGRAM(S): 20 TABLET, DELAYED RELEASE ORAL at 05:59

## 2021-06-03 NOTE — PATIENT PROFILE ADULT - NSPRONUTRITIONRISK_GEN_A_NUR
Pharmacist Admission Medication Reconciliation Pending Note    Prior to Admission Medications were reviewed by the pharmacist and pended for provider review during admission medication reconciliation.    Medications were pended by the pharmacist at this time as follows:    Pended Admission Order Reconciliation Actions     Order Name Action Reordered As    EPINEPHrine 0.3 MG/0.3ML Solution Auto-injector Do Not Order for Admission     betamethasone valerate (VALISONE) 0.1 % cream Do Not Order for Admission     gabapentin (NEURONTIN) 600 MG tablet Order for Admission gabapentin (NEURONTIN) capsule 1,200 mg    acetaminophen (TYLENOL) 500 MG tablet Do Not Order for Admission     levETIRAcetam (KEPPRA) 250 MG tablet Order for Admission levETIRAcetam (KepPRA) tablet 500 mg    venlafaxine XR (EFFEXOR XR) 75 MG 24 hr capsule Order for Admission venlafaxine XR (EFFEXOR XR) 24 hr capsule 225 mg    pantoprazole (PROTONIX) 40 MG tablet Order for Admission pantoprazole (PROTONIX) EC tablet 40 mg    clonazePAM (KLONOPIN) 2 MG tablet Do Not Order for Admission     clonazePAM (KLONOPIN) 2 MG tablet Order for Admission clonazePAM (KlonoPIN) tablet 2 mg    ARIPiprazole (ABILIFY) 15 MG tablet Order for Admission ARIPiprazole (ABILIFY) tablet 15 mg    lisdexamfetamine (VYVANSE) 20 MG capsule Order for Admission lisdexamfetamine (VYVANSE) capsule 20 mg    lisdexamfetamine (VYVANSE) 70 MG capsule Order for Admission lisdexamfetamine (VYVANSE) capsule 70 mg    LOPERAMIDE HCL PO Do Not Order for Admission     Carboxymethylcellul-Glycerin (CLEAR EYES FOR DRY EYES OP) Replace at Admission hydrOXYpropyl methylcellulose (ISOPTO TEARS) 0.5 % ophthalmic solution 2 drop    ibuprofen (MOTRIN) 600 MG tablet Do Not Order for Admission     diclofenac (CATAFLAM) 50 MG tablet Order for Admission naproxen (NAPROSYN) tablet 250 mg            Orders Pended To Continue For Hospital Stay     ID Description Pended By When Reason    [id number] venlafaxine XR  (EFFEXOR XR) 24 hr capsule 225 mg-DAILY WITH BREAKFAST [facility] 1442     [id number] pantoprazole (PROTONIX) EC tablet 40 mg-DAILY BEFORE BREAKFAST [facility]17 1442     [id number] lisdexamfetamine (VYVANSE) capsule 70 mg-EVERY MORNING [facility] 10/06/17 1442     [id number] lisdexamfetamine (VYVANSE) capsule 20 mg-DAILY [facility] 10/06/17 1442     [id number] levETIRAcetam (KepPRA) tablet 500 mg-NIGHTLY [facility] 10/06/17 1442     [id number] gabapentin (NEURONTIN) capsule 1,200 mg-3 TIMES DAILY [facility] 10/06/17 1442     [id number] naproxen (NAPROSYN) tablet 250 mg-3 TIMES DAILY WITH MEALS [facility] 10/06/17 1442     [id number] clonazePAM (KlonoPIN) tablet 2 mg-3 TIMES DAILY [facility] 10/06/17 1442     [id number] hydrOXYpropyl methylcellulose (ISOPTO TEARS) 0.5 % ophthalmic solution 2 drop-PRN [facility] 10/06/17 1442     [id number] ARIPiprazole (ABILIFY) tablet 15 mg-DAILY [facility] 10/06/17 1442             Pharmacist Notations:     - Baclofen and temazepam were left unaddressed since patient reported taking them differently than originally prescribed.    - Non-essential PRN and OTC medications were not continued.     Last edited by Kerline Ruiz [facility] on 10/06/17 at 1443          Orders that are ultimately reconciled and signed during admission medication reconciliation may differ from the pended actions above.    Please contact the pharmacist for questions.    Kerline Ruiz [facility]  10/6/2017 2:43 PM   No indicators present

## 2021-06-03 NOTE — DISCHARGE NOTE NURSING/CASE MANAGEMENT/SOCIAL WORK - PATIENT PORTAL LINK FT
You can access the FollowMyHealth Patient Portal offered by [facility] by registering at the following website: [email or web address]. By joining [facility]’s FollowMyHealth portal, you will also be able to view your health information using other applications (apps) compatible with our system.

## 2021-06-03 NOTE — DISCHARGE NOTE PROVIDER - NSDCFUADDINST_GEN_ALL_CORE_FT
Keep surgical site clean and dry, may remove dressing in 7  days . Call Dr. Hernandez if any wound drainage, redness , increasing pain, fevers over 101 or if you have any questions or concerns.     You may shower with the bandage on and once it is removed. Once it is removed  , do not scrub surgical site. Do not apply any lotions/moisturizers/creams to surgical site.    Call to make your  post op appointment if you do not have one already.

## 2021-06-03 NOTE — DISCHARGE NOTE PROVIDER - CARE PROVIDER_API CALL
Lester Aceves)  Orthopaedic Surgery  [address] [place] [address]  Phone: [phone]  Fax: [phone]  Follow Up Time:

## 2021-06-03 NOTE — PROGRESS NOTE ADULT - SUBJECTIVE AND OBJECTIVE BOX
ORTHO PROGRESS NOTE       80y Female POD #1      S/P left Total Hip Arthroplasty     Patient seen and examined at bedside . The patient is awake and alert in NAD. No complaints of chest pain, SOB, N/V. she offers no complaints, pain is controlled.     PAST MEDICAL & SURGICAL HISTORY:  HTN (hypertension)    Arthritis    Hypercholesteremia    Uterine cancer    Renal calculi    History of surgery  knee surg  randee  lithotripsy    History of lithotripsy  [facility]    S/P RANDEE-BSO    History of arthroscopy of both knees    History of tonsillectomy and adenoidectomy  [facility]    H/O colonoscopy  2021          MEDICATIONS  (STANDING):  acetaminophen   Tablet .. 650 milliGRAM(s) Oral every 6 hours  amLODIPine   Tablet 2.5 milliGRAM(s) Oral at bedtime  aspirin enteric coated 81 milliGRAM(s) Oral two times a day  celecoxib 200 milliGRAM(s) Oral every 12 hours  pantoprazole    Tablet 40 milliGRAM(s) Oral before breakfast  polyethylene glycol 3350 17 Gram(s) Oral at bedtime  senna 2 Tablet(s) Oral at bedtime  sodium chloride 0.9%. 1000 milliLiter(s) (100 mL/Hr) IV Continuous <Continuous>    MEDICATIONS  (PRN):  aluminum hydroxide/magnesium hydroxide/simethicone Suspension 30 milliLiter(s) Oral four times a day PRN Indigestion  magnesium hydroxide Suspension 30 milliLiter(s) Oral daily PRN Constipation  ondansetron Injectable 4 milliGRAM(s) IV Push every 6 hours PRN Nausea and/or Vomiting  traMADol 50 milliGRAM(s) Oral every 4 hours PRN Mild Pain (1 - 3)        Vital Signs Last 24 Hrs  T(C): 35.8 (03 Jun 2021 04:30), Max: 36.7 (02 Jun 2021 09:56)  T(F): 96.5 (03 Jun 2021 04:30), Max: 98.1 (02 Jun 2021 09:56)  HR: 67 (03 Jun 2021 04:30) (65 - 88)  BP: 110/55 (03 Jun 2021 04:30) (106/51 - 132/60)  BP(mean): --  RR: 18 (03 Jun 2021 04:30) (14 - 20)  SpO2: 97% (02 Jun 2021 12:51) (94% - 97%)                      PE:  The patient was seen and examined at bedside          A&OX3, NAD          Aquacel  dressing C/D/I          Compartments soft         NVI, SILT           A/P:              POD #  1     s/p  left Total Hip Arthroplasty                      OOB to Chair            Physical Therapy- wbat           Pain control - per pain protocol            Incentive Spirometry            DVT Prophylaxis - aspirin             f/u am labs                     GI ppx- continue Protonix                   Dispo: home with home care

## 2021-06-03 NOTE — DISCHARGE NOTE PROVIDER - NSDCMRMEDTOKEN_GEN_ALL_CORE_FT
acetaminophen 325 mg oral tablet: 2 tab(s) orally every 6 hours x 14 days  amLODIPine 2.5 mg oral tablet: 1 tab(s) orally once a day (at bedtime)  aspirin 81 mg oral delayed release tablet: 1 tab(s) orally 2 times a day  celecoxib 200 mg oral capsule: 1 cap(s) orally every 12 hours  lovastatin 40 mg oral tablet: 1 tab(s) orally once a day (at bedtime)  pantoprazole 40 mg oral delayed release tablet: 1 tab(s) orally once a day (before a meal)  senna oral tablet: 2 tab(s) orally once a day (at bedtime)  traMADol 50 mg oral tablet: 1 tab(s) orally every 4 hours, As needed, Mild Pain (1 - 3) MDD:6 tablets

## 2021-06-03 NOTE — DISCHARGE NOTE PROVIDER - HOSPITAL COURSE
80 year old female with a  past medical history of HTN and HLD was admitted for an elective Total Hip Arthroplasty, Ms. Masters tolerated surgery well with no intra/post operative complications. She was given intra/post operative antibiotics for infection prophylaxis and will be discharged on Aspirin 81mg BID  to lower the risk of blood clots. She worked with Physical Therapy while admitted to the hospital and is stable for discharge.

## 2021-06-04 LAB
COVID-19 SPIKE DOMAIN AB INTERP: POSITIVE
COVID-19 SPIKE DOMAIN ANTIBODY RESULT: >250 U/ML — HIGH
SARS-COV-2 IGG+IGM SERPL QL IA: >250 U/ML — HIGH
SARS-COV-2 IGG+IGM SERPL QL IA: POSITIVE

## 2021-06-15 DIAGNOSIS — Z60.2 PROBLEMS RELATED TO LIVING ALONE: ICD-10-CM

## 2021-06-15 DIAGNOSIS — I10 ESSENTIAL (PRIMARY) HYPERTENSION: ICD-10-CM

## 2021-06-15 DIAGNOSIS — M16.12 UNILATERAL PRIMARY OSTEOARTHRITIS, LEFT HIP: ICD-10-CM

## 2021-06-15 DIAGNOSIS — Z85.42 PERSONAL HISTORY OF MALIGNANT NEOPLASM OF OTHER PARTS OF UTERUS: ICD-10-CM

## 2021-06-15 DIAGNOSIS — E78.00 PURE HYPERCHOLESTEROLEMIA, UNSPECIFIED: ICD-10-CM

## 2021-06-15 DIAGNOSIS — Z88.1 ALLERGY STATUS TO OTHER ANTIBIOTIC AGENTS STATUS: ICD-10-CM

## 2021-06-15 DIAGNOSIS — Z90.710 ACQUIRED ABSENCE OF BOTH CERVIX AND UTERUS: ICD-10-CM

## 2021-06-15 DIAGNOSIS — Z87.442 PERSONAL HISTORY OF URINARY CALCULI: ICD-10-CM

## 2021-06-15 DIAGNOSIS — Z91.041 RADIOGRAPHIC DYE ALLERGY STATUS: ICD-10-CM

## 2021-06-15 SDOH — SOCIAL STABILITY - SOCIAL INSECURITY: PROBLEMS RELATED TO LIVING ALONE: Z60.2

## 2022-05-29 ENCOUNTER — NON-APPOINTMENT (OUTPATIENT)
Age: 82
End: 2022-05-29

## 2022-05-30 ENCOUNTER — TRANSCRIPTION ENCOUNTER (OUTPATIENT)
Age: 82
End: 2022-05-30

## 2022-10-11 ENCOUNTER — APPOINTMENT (OUTPATIENT)
Dept: ORTHOPEDIC SURGERY | Facility: CLINIC | Age: 82
End: 2022-10-11

## 2022-11-18 ENCOUNTER — APPOINTMENT (OUTPATIENT)
Dept: ORTHOPEDIC SURGERY | Facility: CLINIC | Age: 82
End: 2022-11-18

## 2022-11-18 VITALS — WEIGHT: 150 LBS | BODY MASS INDEX: 25.61 KG/M2 | HEIGHT: 64 IN

## 2022-11-18 PROCEDURE — 73502 X-RAY EXAM HIP UNI 2-3 VIEWS: CPT

## 2022-11-18 PROCEDURE — 99213 OFFICE O/P EST LOW 20 MIN: CPT

## 2022-11-18 NOTE — HISTORY OF PRESENT ILLNESS
[de-identified] : f/u of b/l hips\par left hip replacement doing well\par right hip now becoming symptomatic\par pain with activity\par \par Imaging:\par X-rays were reviewed with the patient.  \par AP and Lateral views were obtained of the hips, including the contralateral side for comparison purposes.\par X-rays are negative for acute bone or soft tissue trauma.\par left hip replacement in good position\par right hip with moderate OA\par \par plan for nonop management, if symptoms progresswill consider r ashley

## 2023-01-04 ENCOUNTER — NON-APPOINTMENT (OUTPATIENT)
Age: 83
End: 2023-01-04

## 2023-07-10 NOTE — PACU DISCHARGE NOTE - NSPTMEETSDISCHCRITERIADT_GEN_A_CORE
02-Jun-2021 10:56 Quality 130: Documentation Of Current Medications In The Medical Record: Current Medications Documented Detail Level: Detailed Quality 431: Preventive Care And Screening: Unhealthy Alcohol Use - Screening: Patient not identified as an unhealthy alcohol user when screened for unhealthy alcohol use using a systematic screening method Quality 226: Preventive Care And Screening: Tobacco Use: Screening And Cessation Intervention: Patient screened for tobacco use and is an ex/non-smoker

## 2024-05-10 ENCOUNTER — APPOINTMENT (OUTPATIENT)
Dept: ORTHOPEDIC SURGERY | Facility: CLINIC | Age: 84
End: 2024-05-10
Payer: MEDICARE

## 2024-05-10 PROCEDURE — 99213 OFFICE O/P EST LOW 20 MIN: CPT

## 2024-05-10 PROCEDURE — 73521 X-RAY EXAM HIPS BI 2 VIEWS: CPT

## 2024-05-10 RX ORDER — NAPROXEN 500 MG/1
500 TABLET ORAL
Qty: 60 | Refills: 1 | Status: ACTIVE | COMMUNITY
Start: 2024-05-10 | End: 1900-01-01

## 2024-05-10 NOTE — HISTORY OF PRESENT ILLNESS
[de-identified] : Patient is a 83-year-old female here for reevaluation of right hip.  Patient has history of left total hip placement Dr. Adams.  Patient was told in the past she also has right hip osteoarthritis and may need a hip replacement the future.  Patient states right hip has been worsening over past few months with out any recent injury/trauma.  Denies numbness or tingling, denies instability  Right hip exam: No effusion, ecchymosis, erythema, no tense palpation, mild decreased range of motion with pain to groin, positive management, neurovascular intact

## 2024-05-10 NOTE — DISCUSSION/SUMMARY
[de-identified] : Discussed with patient detail that she has osteoarthritis of right hip.  Discussed treatment options detail including rest, ice that she can range of motion excise, physical therapy, conservative or surgical treatments.  Patient interested in total hip replacement.  Patient will follow-up with Dr. Jackie Hawk for surgical consult.  Naproxen sent to patient's pharmacy, discussed side effects in detail

## 2024-05-10 NOTE — DATA REVIEWED
[FreeTextEntry1] : X-ray bilateral hips for comparison: No acute fractures, subluxations, dislocations.  Moderate osteoarthritis right hip.  Left hip surgical hardware intact in good position

## 2024-06-21 ENCOUNTER — APPOINTMENT (OUTPATIENT)
Dept: ORTHOPEDIC SURGERY | Facility: CLINIC | Age: 84
End: 2024-06-21
Payer: MEDICARE

## 2024-06-21 DIAGNOSIS — M16.11 UNILATERAL PRIMARY OSTEOARTHRITIS, RIGHT HIP: ICD-10-CM

## 2024-06-21 PROCEDURE — 99213 OFFICE O/P EST LOW 20 MIN: CPT

## 2024-06-22 PROBLEM — M16.11 ARTHRITIS OF RIGHT HIP: Status: ACTIVE | Noted: 2019-05-24

## 2024-06-22 NOTE — HISTORY OF PRESENT ILLNESS
[de-identified] : f/u of rt hip pain has improved  on exam no pain with prom  x-rays shows moderate OA given improvement in symptoms will hold off on surgery f/u if symptoms return.

## 2024-10-19 ENCOUNTER — NON-APPOINTMENT (OUTPATIENT)
Age: 84
End: 2024-10-19

## 2024-12-06 ENCOUNTER — APPOINTMENT (OUTPATIENT)
Dept: ORTHOPEDIC SURGERY | Facility: CLINIC | Age: 84
End: 2024-12-06

## 2025-05-21 ENCOUNTER — APPOINTMENT (OUTPATIENT)
Dept: ORTHOPEDIC SURGERY | Facility: CLINIC | Age: 85
End: 2025-05-21
Payer: MEDICARE

## 2025-05-21 DIAGNOSIS — S89.92XA UNSPECIFIED INJURY OF LEFT LOWER LEG, INITIAL ENCOUNTER: ICD-10-CM

## 2025-05-21 DIAGNOSIS — S89.91XA UNSPECIFIED INJURY OF RIGHT LOWER LEG, INITIAL ENCOUNTER: ICD-10-CM

## 2025-05-21 PROCEDURE — 99213 OFFICE O/P EST LOW 20 MIN: CPT
